# Patient Record
Sex: FEMALE | Race: WHITE | NOT HISPANIC OR LATINO | URBAN - METROPOLITAN AREA
[De-identification: names, ages, dates, MRNs, and addresses within clinical notes are randomized per-mention and may not be internally consistent; named-entity substitution may affect disease eponyms.]

---

## 2019-12-19 ENCOUNTER — HOSPITAL ENCOUNTER (OUTPATIENT)
Dept: SURGERY | Facility: HOSPITAL | Age: 69
Setting detail: HOSPITAL OUTPATIENT SURGERY
Discharge: HOME | End: 2019-12-19
Attending: OPHTHALMOLOGY

## 2019-12-19 LAB — GLUCOSE BLD-MCNC: 247 MG/DL (ref 65–99)

## 2020-01-23 ENCOUNTER — HOSPITAL ENCOUNTER (OUTPATIENT)
Dept: SURGERY | Facility: HOSPITAL | Age: 70
Setting detail: HOSPITAL OUTPATIENT SURGERY
Discharge: HOME | End: 2020-01-23
Attending: OPHTHALMOLOGY

## 2020-01-23 LAB — GLUCOSE BLD-MCNC: 232 MG/DL (ref 65–99)

## 2021-04-20 ENCOUNTER — OFFICE VISIT CONVERTED (OUTPATIENT)
Dept: UROLOGY | Facility: CLINIC | Age: 71
End: 2021-04-20
Attending: UROLOGY

## 2021-04-20 ENCOUNTER — CONVERSION ENCOUNTER (OUTPATIENT)
Dept: SURGERY | Facility: CLINIC | Age: 71
End: 2021-04-20

## 2021-04-20 LAB
BILIRUB UR QL STRIP: NEGATIVE
COLOR UR: YELLOW
CONV CLARITY OF URINE: CLEAR
CONV PROTEIN IN URINE BY AUTOMATED TEST STRIP: NEGATIVE
CONV UROBILINOGEN IN URINE BY AUTOMATED TEST STRIP: 0.2
GLUCOSE UR QL: 100
HGB UR QL STRIP: NEGATIVE
KETONES UR QL STRIP: NEGATIVE
LEUKOCYTE ESTERASE UR QL STRIP: NEGATIVE
NITRITE UR QL STRIP: NEGATIVE
PH UR STRIP.AUTO: 5.5 [PH]
SP GR UR: 1.01

## 2021-05-11 NOTE — H&P
History and Physical      Patient Name: Keara John   Patient ID: [id number]   Sex: Female   YOB: 1950    Primary Care Provider: Berhane Preston MD   Referring Provider: Berhane Preston MD    Visit Date: 2021    Provider: Teodora Claire MD   Location: [facility]   Location Address: [address] [place] [address]  [id number]   Location Phone: [phone]          Chief Complaint  · Patient is here for urological concerns      History Of Present Illness  The patient is a 70 year old /White female, who presents on referral from Berhane Preston MD, for an urological evaluation for urinary incontinence which the patient associates with no known event.   Urge Incontinence: The patient states she has had recurrent episodes without urgency in the past 1 year. She describes leakage of large amounts of urine. The patients symptoms are stable. The patient uses 3 pads a day. She reports no additional complaints. She denies bladder pain, incomplete emptying, and poor stream.   Stress Incontinence: She does have leaking with coughing, sneezing, laughing, and And without Valsalva maneuvers. This has been occuring for 1 year. This leaking has been remains stable.   The patient notes aggravating factors are none. There no identified alleviating factors. Her past medical history is notable for a history of Arthritis, Chronic Obstructive Pulmonary Disease, Diabetes, Low back pain, Obesity, Shortness Of Air, Sleep apnea, and Weakness generalized.   She denies a history of recurrent urinary tract infections. The patient has not been previously evaluated for this condition.   Previous Treatment: She has been previously treated with Ditropan and Detrol.      Patient has history of hysterectomy in the 90s  Had  x2  Reports leakage with movement and at times without sensory awareness; discusses that she drinks a lot because of her diabetes and reports baseline  "polyuria.  Requests new bedside commode.  Has significant mobility limitations, uses walker and becomes very short of breath with exertion.  Does not drive.  Unable to get onto exam table for examination today.       Past Medical History  Abdominal bloating; Allergic rhinitis; Anxiety disorder; Arthritis; Arthritis cervical spine; Bladder disorder; Candidiasis; Cataract; Cellulitis; Chronic Obstructive Pulmonary Disease; COPD (chronic obstructive pulmonary disease); Depression; Dermatitis; Diabetes; Diabetes mellitus; Diarrhea; Dysuria; Edema; Furuncles; GERD; Hepatitis; High blood pressure; High cholesterol; Hypothyroidism; Low back pain; Obesity; Osteoarthritis (arthritis due to wear and tear of joints); Pelvic pain in female; Shortness Of Air; Sinusitis, acute; Sleep apnea; ARRON (stress urinary incontinence, female); Thrush; Urinary urgency; Vitamin D Deficiency; Weakness generalized         Past Surgical History  Back; Gallbladder; Hysterectomy-Abdominal         Medication List  atorvastatin 40 mg oral tablet; duloxetine 30 mg oral capsule,delayed release(DR/EC); Insulin Syringe 1 mL 29 gauge x 1/2\" miscellaneous syringe; levothyroxine 75 mcg oral capsule; lisinopril 40 mg oral tablet; meloxicam 7.5 mg oral tablet; metformin 500 mg oral tablet; Novolog Mix 70-30 U-100 Insuln 100 unit/mL (70-30) subcutaneous solution; pantoprazole 40 mg oral tablet,delayed release (DR/EC); Ventolin HFA 90 mcg/actuation inhalation HFA aerosol inhaler         Allergy List  amitriptyline; Dilantin; Latex Exam Gloves; SULFA (SULFONAMIDES); Tegretol       Allergies Reconciled  Family Medical History  Diabetes, unspecified type; Renal Cancer         Social History  Tobacco (Former)         Review of Systems  · Constitutional  o Denies  o : fever, chills  · Gastrointestinal  o Denies  o : nausea, vomiting      Vitals  Date Time BP Position Site L\R Cuff Size HR RR TEMP (F) WT  HT  BMI kg/m2 BSA m2 O2 Sat FR L/min FiO2 HC       04/20/2021 " "11:40 AM       20  291lbs 2oz 5'  5\" 48.45 2.46             Physical Examination  · Constitutional  o Appearance  o : Well nourished, morbidly obese patient in no acute distress.  · Eyes  o Conjunctivae  o : Conjunctivae normal  o Sclerae  o : Sclerae white  · Ears, Nose, Mouth and Throat  o Ears  o :   § Hearing  § : Intact to conversational voice both ears  § Ears  § : Normal  o Nose  o :   § External Nose  § : Appearance normal  · Respiratory  o Respiratory Effort  o : labored breathing present, no accessory muscle use  · Gastrointestinal  o Abdominal Examination  o : Appears soft and nondistended  · Genitourinary  o FEMALE  EXAM:  o :   §  and rectal exam deferred  § :  and rectal exam deferred per patient  · Skin and Subcutaneous Tissue  o General Inspection  o : No rashes, lesions, or areas of discoloration present  o General Palpation  o : Skin Turgor Normal  · Neurologic  o Mental Status Examination  o :   § Orientation  § : Alert and Oriented X3  o Gait and Station  o :   § Gait Screening  § : Ambulating with difficulty; using walker  · Psychiatric  o Mood and Affect  o : Mood normal, affect appropriate          Results  · In-Office Procedures  o Lab procedure  § Automated Dipstick Urinalysis (Surg Spec) WITHOUT Micro [facility] (09228)   § Color Ur: Yellow   § Clarity Ur: Clear   § Glucose Ur Ql Strip: 100   § Bilirub Ur Ql Strip: Negative   § Ketones Ur Ql Strip: Negative   § Sp Gr Ur Qn: 1.015   § Hgb Ur Ql Strip: Negative   § pH Ur-LsCnc: 5.5   § Prot Ur Ql Strip: Negative   § Urobilinogen Ur Strip-mCnc: 0.2   § Nitrite Ur Ql Strip: Negative   § WBC Est Ur Ql Strip: Negative       Assessment  · Urinary Incontinence     788.30/R32  · Stress Incontinence     625.6    Problems Reconciled  Plan  · Medications  o Medications have been Reconciled  o Transition of Care or Provider Policy  · Instructions  o Electronically Identified Patient Education Materials Provided Electronically     Patient's history " "consistent with stress urinary incontinence.  Stress urinary incontinence discussed with patient at length.  Discussed that this will be significantly exacerbated by her medical comorbidities.  Unfortunately, management options very limited for this patient.  Discussed American urologic Association guidelines for management of stress urinary incontinence; discussed that there is no medication to treat or aid stress urinary incontinence.  Would not recommend further treatment via medical management for her incontinence.    Discussed and recommended behavioral modifications including timed and double voiding; limiting bladder irritants; fluid management.  Patient notes limitations of her ability to achieve these recommendations given her mobility restraints and diabetes as she \"drinks all the time.\"  Aware that this will significantly exacerbate her polyuria and thus her incontinence episodes.    Discussed that aside from behavioral modifications, recommend pelvic floor exercises, Kegel maneuvers, this was discussed with her.  Also provided information regarding pelvic floor physical therapy or specific incontinence pessary.  Aware that our office does not evaluate for or supply incontinence pessary.    She states she is not a candidate for this as she does not drive and infrequently leaves home.    Discussed additional management option for ARRON to be procedural or surgically based; do not believe patient to be a candidate for these options given her comorbidities as these would likely be of higher risk than benefit.    She deferred pelvic examination today, aware that a pelvic exam is mandatory to guide further treatment recommendations.  Also, could consider further work-up via cystoscopy and urodynamic testing, but given patient's shortness of air on exertion, mobility restraints, is not eligible for this work-up at this time.  She notes understanding and is agreeable with this.    Patient notes understanding of " our discussion  Informational handouts provided.  Prescription provided for new bedside commode to aid with frequently emptying her bladder  Patient encouraged to arrange follow-up with urology as needed      Total time for encounter greater than 35 minutes due to face to face time which dominated greater than 51% of the encounter, including counseling and coordination of care and review of records.                 Electronically Signed by: Teodora Claire MD -Author on May 2, 2021 12:22:59 PM

## 2021-05-14 VITALS — BODY MASS INDEX: 48.5 KG/M2 | RESPIRATION RATE: 20 BRPM | HEIGHT: 65 IN | WEIGHT: 291.12 LBS

## 2022-01-20 ENCOUNTER — APPOINTMENT (OUTPATIENT)
Dept: CARDIAC REHAB | Facility: HOSPITAL | Age: 72
End: 2022-01-20

## 2022-02-24 ENCOUNTER — APPOINTMENT (OUTPATIENT)
Dept: CARDIAC REHAB | Facility: HOSPITAL | Age: 72
End: 2022-02-24

## 2022-03-15 ENCOUNTER — APPOINTMENT (OUTPATIENT)
Dept: CARDIAC REHAB | Facility: HOSPITAL | Age: 72
End: 2022-03-15

## 2022-05-26 ENCOUNTER — TRANSCRIBE ORDERS (OUTPATIENT)
Dept: PHYSICAL THERAPY | Facility: CLINIC | Age: 72
End: 2022-05-26

## 2022-05-26 DIAGNOSIS — M62.3 IMMOBILITY SYNDROME: Primary | ICD-10-CM

## 2022-07-15 ENCOUNTER — TREATMENT (OUTPATIENT)
Dept: PHYSICAL THERAPY | Facility: CLINIC | Age: 72
End: 2022-07-15

## 2022-07-15 DIAGNOSIS — Z46.89 FITTING OR ADJUSTMENT OF WHEELCHAIR: ICD-10-CM

## 2022-07-15 DIAGNOSIS — M62.3 IMMOBILITY SYNDROME: Primary | ICD-10-CM

## 2022-07-15 PROCEDURE — 97161 PT EVAL LOW COMPLEX 20 MIN: CPT | Performed by: PHYSICAL THERAPIST

## 2022-07-15 NOTE — PROGRESS NOTES
Physical Therapy Initial Evaluation and Plan of Care      Patient: Keara John   : 1950  Diagnosis/ICD-10 Code:  Immobility syndrome [M62.3]  Referring practitioner: Berhane Preston MD  Date of Initial Visit: 7/15/2022  Today's Date: 7/15/2022  Patient seen for 1 sessions         Visit Diagnoses:    ICD-10-CM ICD-9-CM   1. Immobility syndrome  M62.3 728.3   2. Fitting or adjustment of wheelchair  Z46.89 V53.8       Subjective Evaluation    History of Present Illness  Mechanism of injury: Ms. John uses a rollator to get around her house - she has to sit to mop, dishes, etc.  She has a RW in case.  She lives alone.  She no longer drives - too hard for her to get in/out house and car.  Her legs are numb - DM neuropathy, cannot feel feet, using walker is hard for that reason, falls risk.      Hard to stand up straight with walker but bends forwards because back pain, she has trouble hold herself up due to leg weakness and UE pain.  She can only stand, maybe 5 minutes at a time.  She has the rollator to sit in between.     Redness and sores in her legs B in lower leg, started with white sores, seeping and bubbles/blister earlier this year.  She has swelling in the lower legs B.     Sleep in lift chair, trouble getting in bed/laying flat due to bladder issues.  She uses bedside commode during day/night.  She mainly stays in the living room and kitchen.  She will go the bathroom to bathe. She has a sliding/transfer chair/bench for showers, handrails.     Fell 3x , last one was about a year ago.  She was getting dizzy, she had stent placement in heart, shortness or air, weakness and numbness in legs.     SOA with activities - uses inhaler, nebulizer  No supplemental oxygen    PMH:  Lumbar back surgery  Needs surgery on the upper back - not a candidate at this time for health issues she noted    On disability    Pain in the R knee and lower leg  Neck pain    Social Support  Lives in: apartment (ramp)  Lives with:  alone    Hand dominance: right             Objective          Ambulation     Comments   Shoulder MMT  Flexion: L: 3+/5  R:3+/5  Extension: L: 3+/5  R:3+/5  Abduction: L: 3/5  R:3/5  Internal rotation: L: 3/5  R:3/5  External rotation: L: 3/5  R:3/5    Shoulder ROM:  Flexion: L: 90 degrees    R:90 degrees  Extension: L: 40 degrees    R:40 degrees  Abduction: L: 90 degrees    R:90 degrees  Internal rotation: L: 30 degrees    R:40 degrees  External rotation: L: 20 degrees    R:15 degrees    Elbow MMT  Flexion: L: 4/5  R:4/5  Extension: L: 4/5  R:4/5    Elbow ROM  Flexion: L: 120 degrees    R:120 degrees  Extension: L: 0 degrees    R:0 degrees    Hip MMT  Flexion: L: 3/5  R:3/5  Abduction: L: 3+/5  R:3+/5  Internal rotation: L: 3/5  R:3+/5  External rotation: L: 3+/5  R:3+/5    Hip ROM  Flexion: L: 95 degrees    R:90 degrees  Abduction: L: 30 degrees    R:20 degrees    Knee MMT  Flexion: L: 3+/5  R:4-/5  Extension: L: 3+/5  R:3+/5    Knee ROM  Flexion: L: 90 degrees    R:95 degrees  Extension: L: -5 degrees    R:-5 degrees      Gait: Pt was able to complete the TUG in 48 seconds.  She was using a RW, shuffling feet, decreased balance, RLE buckled slightly (no fall), forward flexed posture at trunk.    Pressure sores: red, burning sores on the bottom - she has trouble wiping herself/cleaning herself            Assessment & Plan     Assessment  Impairments: abnormal gait, abnormal or restricted ROM, activity intolerance, impaired balance, impaired physical strength, pain with function, safety issue and weight-bearing intolerance  Functional Limitations: walking, uncomfortable because of pain, standing and stooping  Assessment details: Ms. John would benefit from a Group 2 single power chair HD.  She would benfit from a skin protection cushion and power tilt to help decrease pressure sores.  She would benefit from power elevating leg rests to help with swelling in the BLE.  Headrest to help with support for the neck to  decresae overall neck pain.  Height adjustable armrests to help maneuver herself in the chair.    She is not a candidate for a scooter, decrease risk of tipping due to increased weight. She has decreased BLE strength, poor balance, and neuropathy, making it difficulty to use a RW and SPC.   She is unable to propel the wheelchair manually, poor strength and sensation in her B legs and UE weakness.  Increased swelling and pain in the legs.  She is a falls risk with a high score of 48 seconds on the TUG.    OSMAN Cunningham, with [facility] was present during the evaluation.       Plan  Plan details: Ms. John will benefit from a group 2 single power HD with skin protection cushion, power tilt, power elevating leg prests, headrest, and heigh adjustable armrests.           History # of Personal Factors and/or Comorbidities: HIGH (3+)  Examination of Body System(s): # of elements: HIGH (4+)  Clinical Presentation: STABLE   Clinical Decision Making: LOW     Timed:  Manual Therapy:         mins  70982;  Therapeutic Exercise:         mins  81558;     Neuromuscular Collin:        mins  06958;    Therapeutic Activity:          mins  97586;     Gait Training:           mins  63583;     Ultrasound:         mins  37735;    Canalith Repos           ___  mins  61344      Untimed:  Electrical Stimulation:         mins  59150 ( );  Mechanical Traction:         mins  94319;   Dry Needling:                     mins self pay  Low Eval:                           mins  38389;  Medium Eval:                     mins  32899;   High Eval:                          mins  45255       Timed Treatment:     mins   Total Treatment:     45   mins    PT SIGNATURE: Marce Gasca PT, DPT  [place] License: [id number]  Electronically signed by Marce Gasca PT, 07/15/22, 3:31 PM EDT      Initial Certification    Certification Period: 7/15/2022 thru 10/12/2022  I certify that the therapy services are furnished while this patient is under my care.  The services  outlined above are required by this patient, and will be reviewed every 90 days.     PHYSICIAN: Berhane Preston  NPI: [id number]            PHYSICIAN PRINT NAME: ______________________________________________      PHYSICIAN SIGNATURE: ______________________________________________         DATE:________________________________        Please sign and return via fax to [phone].  Thank you, [facility].